# Patient Record
Sex: FEMALE | Race: WHITE | NOT HISPANIC OR LATINO | URBAN - METROPOLITAN AREA
[De-identification: names, ages, dates, MRNs, and addresses within clinical notes are randomized per-mention and may not be internally consistent; named-entity substitution may affect disease eponyms.]

---

## 2019-04-30 ENCOUNTER — TRANSCRIBE ORDERS (OUTPATIENT)
Dept: MATERNAL FETAL MEDICINE | Facility: CLINIC | Age: 39
End: 2019-04-30

## 2019-04-30 ENCOUNTER — TRANSFERRED RECORDS (OUTPATIENT)
Dept: HEALTH INFORMATION MANAGEMENT | Facility: CLINIC | Age: 39
End: 2019-04-30

## 2019-04-30 ENCOUNTER — MEDICAL CORRESPONDENCE (OUTPATIENT)
Dept: HEALTH INFORMATION MANAGEMENT | Facility: CLINIC | Age: 39
End: 2019-04-30

## 2019-04-30 DIAGNOSIS — O26.90 PREGNANCY RELATED CONDITION, ANTEPARTUM: Primary | ICD-10-CM

## 2019-05-03 ENCOUNTER — AMBULATORY - HEALTHEAST (OUTPATIENT)
Dept: MATERNAL FETAL MEDICINE | Facility: HOSPITAL | Age: 39
End: 2019-05-03

## 2019-05-03 DIAGNOSIS — O26.90 PREGNANCY, ANTEPARTUM, COMPLICATIONS: ICD-10-CM

## 2019-05-17 ENCOUNTER — AMBULATORY - HEALTHEAST (OUTPATIENT)
Dept: MATERNAL FETAL MEDICINE | Facility: HOSPITAL | Age: 39
End: 2019-05-17

## 2019-05-22 ENCOUNTER — RECORDS - HEALTHEAST (OUTPATIENT)
Dept: ULTRASOUND IMAGING | Facility: HOSPITAL | Age: 39
End: 2019-05-22

## 2019-05-22 ENCOUNTER — OFFICE VISIT - HEALTHEAST (OUTPATIENT)
Dept: MATERNAL FETAL MEDICINE | Facility: HOSPITAL | Age: 39
End: 2019-05-22

## 2019-05-22 ENCOUNTER — RECORDS - HEALTHEAST (OUTPATIENT)
Dept: ADMINISTRATIVE | Facility: OTHER | Age: 39
End: 2019-05-22

## 2019-05-22 DIAGNOSIS — O26.90 PREGNANCY, ANTEPARTUM, COMPLICATIONS: ICD-10-CM

## 2019-05-22 DIAGNOSIS — O26.90 PREGNANCY RELATED CONDITIONS, UNSPECIFIED, UNSPECIFIED TRIMESTER: ICD-10-CM

## 2019-05-22 DIAGNOSIS — O09.522 AMA (ADVANCED MATERNAL AGE) MULTIGRAVIDA 35+, SECOND TRIMESTER: ICD-10-CM

## 2019-05-22 DIAGNOSIS — O09.522 SUPERVISION OF ELDERLY MULTIGRAVIDA, SECOND TRIMESTER: ICD-10-CM

## 2019-10-11 ENCOUNTER — HOSPITAL ENCOUNTER (OUTPATIENT)
Dept: MEDSURG UNIT | Facility: CLINIC | Age: 39
Discharge: HOME | End: 2019-10-11
Attending: OBSTETRICS & GYNECOLOGY | Admitting: OBSTETRICS & GYNECOLOGY

## 2019-10-11 LAB
CLUE CELLS: ABNORMAL
RUPTURE OF FETAL MEMBRANES BY ROM PLUS: NEGATIVE
TRICHOMONAS, WET PREP: ABNORMAL
YEAST, WET PREP: ABNORMAL

## 2019-10-11 RX ORDER — CETIRIZINE HYDROCHLORIDE, PSEUDOEPHEDRINE HYDROCHLORIDE 5; 120 MG/1; MG/1
1 TABLET, FILM COATED, EXTENDED RELEASE ORAL 2 TIMES DAILY
Status: SHIPPED | COMMUNITY
Start: 2019-10-11

## 2019-10-11 RX ORDER — FERROUS SULFATE 325(65) MG
TABLET ORAL
Status: SHIPPED | COMMUNITY
Start: 2019-10-11

## 2019-10-11 ASSESSMENT — MIFFLIN-ST. JEOR: SCORE: 1285.96

## 2019-10-14 ENCOUNTER — HOME CARE/HOSPICE - HEALTHEAST (OUTPATIENT)
Dept: HOME HEALTH SERVICES | Facility: HOME HEALTH | Age: 39
End: 2019-10-14

## 2019-10-15 ENCOUNTER — HOME CARE/HOSPICE - HEALTHEAST (OUTPATIENT)
Dept: HOME HEALTH SERVICES | Facility: HOME HEALTH | Age: 39
End: 2019-10-15

## 2019-10-22 ENCOUNTER — COMMUNICATION - HEALTHEAST (OUTPATIENT)
Dept: ADMINISTRATIVE | Facility: CLINIC | Age: 39
End: 2019-10-22

## 2019-10-25 ENCOUNTER — AMBULATORY - HEALTHEAST (OUTPATIENT)
Dept: PEDIATRICS | Facility: CLINIC | Age: 39
End: 2019-10-25

## 2021-05-26 VITALS
RESPIRATION RATE: 16 BRPM | SYSTOLIC BLOOD PRESSURE: 118 MMHG | HEART RATE: 101 BPM | DIASTOLIC BLOOD PRESSURE: 64 MMHG | OXYGEN SATURATION: 98 % | TEMPERATURE: 98 F

## 2021-05-29 NOTE — PROGRESS NOTES
[facility]etal [facility] [place]  Genetic Counseling Consult    Patient:  Lisa Ruzicka YOB: 1980   Date of Service:  2019      Lisa Ruzicka was seen with the father of the pregnancy, Guzman, at the [facility] for genetic consultation as part of her appointment for comprehensive ultrasound.  The indication for genetic counseling is advanced maternal age.       Impression/Plan:   1. Ayesha had a cell-free fetal DNA test earlier in pregnancy, which was normal.    2. Ayesha had a comprehensive (level II) ultrasound today.  Please see the ultrasound report for further details.    3. The patient declines genetic amniocentesis and additional maternal serum screening today.    Pregnancy History:   /Parity:    Age at Delivery: 38 y.o.  SILVER: 10/8/2019, by Last Menstrual Period  Gestational Age: 20w1d    No significant complications or exposures were reported in the current pregnancy.    Ayesha dee pregnancy history is significant for a TAB several years ago.    Medical History:   Ayesha dee reported medical history is not expected to impact pregnancy management or risks to fetal development.       Family History:   A three-generation pedigree was obtained, and is scanned under the  Media  tab.   The following significant findings were reported by Ayesha and Guzman:    Guzman reports that he was diagnosed with multiple sclerosis when he was 36. We discussed that MS is not an inherited condition, in that it cannot be directly passed from parent to child. However, having a family history of the condition, particularly in a first degree relative, does increase the risk for an individual to develop MS in their lifetime.     Ayesha reports that one of her paternal first cousins has a daughter who was born with a shortened leg. She believes the child's foot was fully formed, and the leg was just shorter than the other leg. We discussed that this leg length  She presents today for a right knee arthroscopy --I reviewed the H&P, I examined the patient, and there are no changes in the patient's condition. This above note talks about her surgery and I see no need for further testing.   She will need crutches, and we reviewed the need for postop DVT prophylaxis in the form of daily ASA for 4 weeks.    discrepancy was likely sporadic. Additionally, the affected individual is a distant enough relative to Ayesha that it is unlikely to impact risks in the current pregnancy.     Ayesha reports that her paternal grandparents had a set of twins who  in infancy from an infection they caught in the hospital after birth. This family history is not expected to impact risks in the current pregnancy.     Otherwise, the reported family history is negative for multiple miscarriages, stillbirths, birth defects, intellectual disability, known genetic conditions, and consanguinity.       Carrier Screening:   The patient reports that she and the father of the pregnancy have  ancestry:          Cystic fibrosis is an autosomal recessive genetic condition that occurs with increased frequency in individuals of  ancestry and carrier screening for this condition is available.  In addition,  screening in the [facility] includes cystic fibrosis.      Expanded carrier screening for mutations in a large panel of genes associated with autosomal recessive conditions including cystic fibrosis, spinal muscular atrophy, and others, is now available.      Carrier screening was beyond the scope of our discussion today.        Risk Assessment for Chromosome Conditions:   We explained that the risk for fetal chromosome abnormalities increases with maternal age. We discussed specific features of common chromosome abnormalities, including Down syndrome, trisomy 13, trisomy 18, and sex chromosome trisomies.      - At age 38 at midtrimester, the risk to have a baby with Down syndrome is 1 in 168.     - At age 38 at midtrimester, the risk to have a baby with any chromosome abnormality is 1 in 82.       Ayesha had maternal serum screening earlier in pregnancy.       Non-invasive Prenatal Testing (NIPT)    Maternal plasma cell-free DNA testing    Screens for fetal trisomy 21, trisomy 13, trisomy 18, and sex chromosome  aneuploidy    First trimester ultrasound with nuchal translucency and nasal bone assessment was not performed in this pregnancy, to our knowledge.    Ayesha had a NIPT test earlier in pregnancy; we reviewed the results today, which are normal for chromosome 13, chromosome 18 and chromosome 21 (no aneuploidy detected) per patient report. A copy of these results were not available for our review today.     Given the accuracy of this test, these results greatly decrease the chance for certain fetal chromosome abnormalities    We discussed the limitations of normal NIPT results    MSAFP (after 15 weeks for open neural tube defect screening) results were not available for our review today. However, per patient report, she had MSAFP drawn and results were normal, significantly reducing the risk for an open neural tube defect in the current pregnancy.          Testing Options:   We discussed the following options:   Non-invasive Prenatal Testing (NIPT)    Maternal plasma cell-free DNA testing; first trimester ultrasound with nuchal translucency and nasal bone assessment is recommended, when appropriate    Screens for fetal trisomy 21, trisomy 13, trisomy 18, and sex chromosome aneuploidy    Cannot screen for open neural tube defects; maternal serum AFP after 15 weeks is recommended       and  Comprehensive (Level II) ultrasound: Detailed ultrasound performed between 18-22 weeks gestation to screen for major birth defects and markers for aneuploidy.          We reviewed the benefits and limitations of this testing.  Screening tests provide a risk assessment specific to the pregnancy for certain fetal chromosome abnormalities, but cannot definitively diagnose or exclude a fetal chromosome abnormality.  Follow-up genetic counseling and consideration of diagnostic testing is recommended with any abnormal screening result.     Diagnostic tests carry inherent risks- including risk of miscarriage- that require careful  consideration.  These tests can detect fetal chromosome abnormalities with greater than 99% certainty.  Results can be compromised by maternal cell contamination or mosaicism, and are limited by the resolution of cytogenetic G-banding technology.  There is no screening nor diagnostic test that can detect all forms of birth defects or mental disability.    It was a pleasure to be involved with Ayesha dee care. Face-to-face time of the meeting was 30 minutes.      Carmen Grubbs MS, [facility]  Licensed Genetic Counselor   [facility]   Maternal Fetal Medicine [facility]  [email or web address]   [place] Office: [phone]   [place] Main [phone]  [facility] Office: [phone]  Pager: [phone] Fax: [phone]

## 2021-05-29 NOTE — PROGRESS NOTES
"Please see \"Imaging\" tab under \"Chart Review\" for details of today's visit.    Gurdeep Vivas        "

## 2021-06-02 NOTE — PROGRESS NOTES
[facility] Lactation Visit    Assessment:  Adalyn R Ruzicka is a 13 days old female infant born at Gestational Age: 40w4d via Vaginal, Spontaneous delivery on 10/12/2019 at 6:18 PM here for lactation support.    Adalyn R Ruzicka is doing well. Adalyn R Ruzicka has gained 16.6 oz since their last visit 10 days ago; approximately 1.6 oz per day.  She is up 12% from birthweight. She was able to transfer 1.4 oz from the breast today, which is less than what I would expect an infant to take at this time. Mild heart-shaped tongue noted today. No visible lingual frenulum. She is gaining weight adequately. Discussed follow up in clinic in 1 week. If transfer of milk does not improve. Consider Johnathan hernandez. Multiple concerns addressed today (infant diaper rash, mother's blocked ducts, milk blebs, milk supply, and infant's vaginal discharge).     Diaper rash: discussed frequent diaper changes. Avoid rubbing skin. May apply aquaphor or desitin after each diaper change.     Discussed management of blocked ducts and milk blebs. Discussed signs and symptoms concerning for mastitis.    Reassurance regarding vaginal discharge. Discussed pseudomenstruation from maternal hormones. Return to clinic if there are any fevers.     1.  difficulty in feeding at breast     2. Diaper rash         Plan:    Feeding plan:  It is important for you to breast-feed Adalyn R Ruzicka every 2-3 hours or more frequently based on Adalyn R Ruzicka's cues. This should be about 8-12 times a day. Offer breasts first before any bottles. When latching, make sure to align nipple to Miguel's nose. Wait for a a wide mouth gape before latching. May help Miguel open her mouth wider by gently applying downward pressure on her chin. Her lips should be flanged. If latch is painful or lips are pursed in, unlatch and try again. Once latched on, stimulate Miguel to actively nurse. Express breast milk in her mouth to stimulate sucking.      Supplementation plan:  Adalyn R Ruzicka should be supplemented with expressed breast-milk or formula as needed after nursing. May offer 1-1.5 oz. This amount may increase based on her cues.     Pumping plan:  To ensure you have adequate milk supply, you should continue to pump after feeds. The more your breast-feed and pump, the more you make more milk. Empty breasts gives you more milk for the next feeding.     Start Vitamin D supplementation 400 international units, once a day, when Adalyn R Ruzicka is back to birthweight.  Adalyn R Ruzicka should have about 6-8 wet diapers and about 2-4 stools per day.     Follow up in clinic in 1 week for lactation as needed. If continuing to have concerns regarding blocked ducts/mastitis, follow up with one of the midwife/lactation consultant.     Please return to clinic sooner, if Adalyn R Ruzicka is not wanting to feed, becomes more sleepy, has less than 4-6 wet diapers in a day, develops a fever greater than 100.4 F, or is inconsolable.   ____________________________________________________________________  SUBJECTIVE:     Adalyn R Ruzicka is a 13 days old female infant born at Gestational Age: 40w4d via Vaginal, Spontaneous delivery on 10/12/2019 at 6:18 PM here for lactation support. This patient is accompanied in the office by her mother. She was discharged on 10/14/19.     Concerns: milk blisters. Mom had trouble with pumping and milk dripping. Sleepiness. She seems to be more alert during the day. Sleepy while nursing.    Baby is nursing 6-7 times a day. She latches on for 5-10 minutes  Mother reports hearing audible swallows.   Baby is supplemented with formula, about 1 oz after feeds (approximately 6-7 times per day). Mom replaces some feedings with a bottle of formula 1.5-3 oz about 3-4 times a day.  Baby has about 6-8 wet diapers and 8+ stools per day. Stools are yellow and seedy in color.    Mom has not pumped in 3 days.. Mom noticed her breasts grew larger and  areolas darkened during pregnancy and she noticed primary engorgement when her milk came in on day 6-7.    Breastfeeding Goals: breast-feed for at least 3-6 months.     Previous Breastfeeding Experience: none.    Breast-surgery: none.    Maternal medications: none. Mom is taking zyrtec D for allergy medications.   Infant medications: hydrocortisone and antifungal for her diaper rash.    Hospital course: Infant is down 8% from birthweight. She is breast-feeding but mother reports infant more sleepy at the breast today. No jaundice appearance on exam. TCB today is 4.1 at 33 hours, low risk. She is voiding and stooling. Discussed with mom more frequent feedings at the breast and stimulation while nursing to encourage active breast-feeding. Encouraged added stimulation with hand expression and pumping after nursing as much as able. Encourage to supplement after nursing with pumped milk, donor milk, or formula and to monitor output.     [place] metabolic screening: normal    Patient Active Problem List    Diagnosis Date Noted     Term , current hospitalization 10/12/2019     Results for orders placed or performed during the hospital encounter of 10/12/19    Metabolic Screen   Result Value Ref Range    Scan Result See Scanned Report    POCT Glucose   Result Value Ref Range    Glucose 86 51 - 139 mg/dL       Current Outpatient Medications:      hydrocortisone 10% Oint, Apply topically. For diaper rash, Disp: , Rfl:   No past medical history on file.  No past surgical history on file.  No family history on file.    Primary care provider: Barbra Nunn MD    OBJECTIVE:    Mother:   Nipples are everted, the areola is compressible, the breast is soft and full.     Sore nipples: white small blisters on tip of bilateral nipples. Appears consistent with milk blebs   Maternal pain: none.    Maternal depression screening: Doing well    Infant:   Vitals:    10/25/19 1603   Pulse: 160   Weight: 6 lb 10.6 oz (3.022 kg)        Average weight gain over last 10 days: 16.6 oz; approx 1.6 oz per day     Weight:   Birthweight: 5 lb 15 oz (2.693 kg)  Discharge weight on 10/14/19: 5 lbs 7.8 oz  Clinic weight on 10/15/19: 5 lbs 10 oz  Today's weight:   Vitals:    10/25/19 1603   Weight: 6 lb 10.6 oz (3.022 kg)       12% from birth weight.    Lactation scale pre-feeding weight: 6 lbs 10.6 oz  Weight after left side feedin lbs 11.6 oz  Weight after right side feedin lbs 12 oz  Amount of milk transferred from LEFT side: 1 oz  Amount of milk transferred from RIGHT side: 0.4 oz    Total transfer: 1.4 oz    Feeding assessment:     Infant can draw gloved finger into mouth. Infant demonstrated a coordinated suck. Infant palate is intact, tongue over gums, palpable frenulum. Mild heart-shaped tip of the tongue.   Infant can hold suction with tongue while at the breast. Shallow latch noted, but corrected with asymmetrical latch. Infant did not need frequent stimulation at the breast.     Alignment: Infant's head was aligned with its trunk. Infant did face mother. Baby was in cross-cradle position today. Discussed importance of infant ventral positioning to obtain a deeper latch.     Areolar Grasp: Infant was assisted by gently applying downward pressure to the chin to open mouth wide. Infant's lips were not pursed. Infant's lips did flange outward. Tongue was visible just barely over bottom lip. Infant had complete seal.     Areolar Compression: Infant made rhythmic motion. There were no clicking or smacking sounds. There was no severe nipple discomfort.  Nipples appeared mild wedging after feeding. No creasing.    Audible swallowing: Infant made quiet sounds of swallowing. There was an increase in frequency after milk ejection reflex.     PHYSICAL EXAM    Gen: Alert, no acute distress.   Head: Anterior and posterior fontanelles are flat and soft.   Eyes: No eye drainage. Sclera clear.   Ears: Pinna appear well-formed. No pits.   Nose: nares  "patent. No nasal congestion. No nasal flaring.  Mouth: Oral mucosa moist. Tongue midline (tongue elevation adequate when crying, good lateralization). Frenulum palpable. Mild \"heart-shaped tip\" of the tongue. Tongue able to extend pass lower gumline. Lips closed at rest.   Neck: Clavicles intact bilaterally.  Lungs: Clear to auscultation bilaterally.   Cardiac: Regular regular rate and rhythm, S1S2, no murmurs. Brachial and femoral pulses +2 and equal.  Abdomen: Soft, nontender, bowel sounds present, no hepatosplenomegaly or mass palpable. Umbilicus dry with no erythema or drainage.   : Odell stage 1 female genitalia  Skin: Intact. Dry. Diaper rash rash. No jaundice.   Musculoskeletal: equal movements of upper and lower extremities. Negative Ortolani and Mead maneuver.  Neuro: Appropriate muscle tone.    The visit lasted a total of 60 minutes that I spent face to face with the patient. Of that time, 55 minutes were spent on lactation. Over 50% of the time was spent counseling and educating the patient about normal  care and growth.    Completed by:   Eduar Krishna, APRN, CPNP, IBCLC  [facility]  [facility]  10/25/2019, 4:10 PM    "

## 2021-06-02 NOTE — TELEPHONE ENCOUNTER
"Returned Ayesha's call.  She is concerned about thrush, as she was at [facility] today and heard from IBCLC that she should rule out thrush.  Baby  is 1 1/2 weeks old and has bright red rash on bottom, \"like a burn,\" which has been present since about 2 days of age.  Have been applying Aquaphor and \"Butt Paste\" with no relief.  Ayesha is also having some nipple tenderness, primarily on initial latch, but no ongoing nipple pain.  She does note some nipple blisters, which have been present since baby was about 5 days old, some whitish and some which have the appearance of blood blisters.  Were very painful, but is now using olive oil on her breast pads and this has been very helpful.    Ayesha also has many concerns about milk supply and baby's needs:  Has been offering breast regularly every 2-3 hours, but feels that baby is not getting enough milk, especially from right breast, so has been giving \"a lot\" of formula.  Reports that baby is now almost one pound over birthweight, per scale at [facility] today--gives formula 1 1/2 - 3 oz on cue, which is every 45 min to 3 hours.  Baby was 5# 15 oz at birth and today weighed 6# 10 oz. Ayesha states that she has been supplementing since birth, because \"baby is so hungry all the time,\"  and as they were concerned about baby's weight and milk was not yet in, started giving donor milk in hospital.  Moved to supplementing with formula upon discharge.  Ayesha states that she was told to feed baby as often as she cues;  Understood this to apply to amounts of formula as well.  \"She cries a lot, and when we give her a bottle, she stops.\"    Regarding breastfeeding, baby is latching better on the left, will latch on the right.  Is hearing baby swallowing.  Offering breast every 2-3 hours.  Ayesha has been pumping, \"but not enough.  I'm trying to get my milk to come in.\"  Purchased new pump flanges, because she has been unable to get flanges to fit well without leaking;  New flanges " also leak.  When pumping has been able to obtain about 1 oz over 10 minutes.  Feels that right side is producing much less, and right did not become as engorged as left.  Has pediatric appt in 2 days.    A:  Baby with diaper rash;  Nipple discomfort;  Overfeeding with formula;  Knowledge deficit regarding feeding cues, milk supply and how to use breast pump    P:  Advised to have pediatrician evaluate diaper rash and check baby for thrush at visit in 2 days.  Praised Ayesha for offering breast on cue, and encouraged her to continue.  Explained that some mild nipple discomfort on initial latch is normal, and if pain does not persist, no action needs to be taken.  To continue with olive oil on nipples if this helps with discomfort.  Educated about appropriate amounts to feed baby of 6# 10 oz:  About 17-18 oz/day in total, breastmilk and formula combined.  Advised that baby does not need formula in amounts of 3 oz, and does not need supplementation as often as every 45 min.  If baby is cuing to feed, and has been recently fed, encouraged to offer breast again to satisfy baby's need to suck and encourage milk supply.  Discussed supply/demand system of milk production, and how giving large amounts of formula decreases the amount of milk that breasts will make.  Recommended making lactation appointment to evaluate baby's intake, nipple integrity, and use of breast pump.    Total time on call:  40 minutes

## 2021-06-02 NOTE — TELEPHONE ENCOUNTER
Pt thinks she may have thrush and is unsure if the baby has it. She didn't know what thrush was until someone told her that she may have it when she went to  for a free group for new mothers. She's also concerned about a diaper rash that the baby has that she says is very inflamed.The first available appt for lactation is on Friday 10/25 and she is not sure that she wants to wait that long. She would like a call back to discuss symptoms and to see if she needs to be seen. She can be reached at [phone]. Ok to leave a message if she doesn't answer.

## 2021-06-02 NOTE — PROGRESS NOTES
Patient [place] to [facility] with complaints of discharge since clinic appointment on Tuesday.  EFM placed. Reactive category 1 tracing obtained, contractions noted every 4-8 minutes. Patient feels some, but not all, as cramping.  ROM+ and wet prep collected.  ROM+ negative for rupture of membranes.  Wet prep negative for yeast and trichomonas, positive for clue cells.  Dr. Montanez updated of above findings.  Dr. Montanez will send prescription Metronidazole to patients pharmacy for patient to  in AM.  Plan DC to home at this time.

## 2021-06-03 VITALS — HEIGHT: 64 IN | WEIGHT: 138 LBS | BODY MASS INDEX: 23.56 KG/M2

## 2021-06-16 PROBLEM — Z34.90 PREGNANT: Status: ACTIVE | Noted: 2019-10-12
